# Patient Record
Sex: MALE | Race: BLACK OR AFRICAN AMERICAN | HISPANIC OR LATINO | Employment: FULL TIME | URBAN - NONMETROPOLITAN AREA
[De-identification: names, ages, dates, MRNs, and addresses within clinical notes are randomized per-mention and may not be internally consistent; named-entity substitution may affect disease eponyms.]

---

## 2022-10-29 ENCOUNTER — OFFICE VISIT (OUTPATIENT)
Dept: URGENT CARE | Facility: CLINIC | Age: 22
End: 2022-10-29
Payer: COMMERCIAL

## 2022-10-29 VITALS
HEART RATE: 69 BPM | DIASTOLIC BLOOD PRESSURE: 102 MMHG | SYSTOLIC BLOOD PRESSURE: 139 MMHG | OXYGEN SATURATION: 97 % | TEMPERATURE: 97.7 F | RESPIRATION RATE: 18 BRPM

## 2022-10-29 DIAGNOSIS — L03.113 CELLULITIS OF RIGHT UPPER EXTREMITY: Primary | ICD-10-CM

## 2022-10-29 PROCEDURE — 99203 OFFICE O/P NEW LOW 30 MIN: CPT

## 2022-10-29 NOTE — PROGRESS NOTES
[facility]        NAME: Jaylin Allen is a 23 y o  male  : 2000    MRN: [id number]  DATE: 2022  TIME: 12:56 PM    Assessment and Plan   Cellulitis of right upper extremity [L03 113]  1  Cellulitis of right upper extremity  Transfer to other facility   Given the extensive streaking up the right forearm recommend ED evaluation and possible IV antibiotics  Patient will go to [facility] via private vehicle today for evaluation  Patient Instructions       Follow up with PCP in 3-5 days  Proceed to  ER if symptoms worsen  Chief Complaint     Chief Complaint   Patient presents with   • Hand Pain     Right  index finger with what appears top be two separate closed lesions onset 4 days ago onset with red streak extending proximal to mid-forearm  c/o pain to forearm and right upper arm         History of Present Illness       Patient is a 23year old male who presents to the office today for a red streak going up his right arm since last night  Has had pain for about 3 days  States infection on finger was about 3 days ago as well  Denies knowing how the wounds got onto his finger  Denies fever or chills  Review of Systems   Review of Systems   Constitutional: Negative for chills and fever  Skin: Positive for wound  All other systems reviewed and are negative  Current Medications     No current outpatient medications on file  Current Allergies     Allergies as of 10/29/2022   • (No Known Allergies)            The following portions of the patient's history were reviewed and updated as appropriate: allergies, current medications, past family history, past medical history, past social history, past surgical history and problem list      History reviewed  No pertinent past medical history  History reviewed  No pertinent surgical history  History reviewed  No pertinent family history  Medications have been verified          Objective   BP (!) 139/102   Pulse 69   Temp 97 7 °F (36 5 °C)   Resp 18   SpO2 97%        Physical Exam     Physical Exam  Vitals and nursing note reviewed  Constitutional:       General: He is not in acute distress  Appearance: Normal appearance  He is normal weight  He is not ill-appearing or toxic-appearing  Cardiovascular:      Pulses: Normal pulses  Pulmonary:      Effort: Pulmonary effort is normal       Breath sounds: Normal breath sounds  Musculoskeletal:         General: Swelling and tenderness present  Right hand: Swelling and tenderness present  No deformity, lacerations or bony tenderness  Normal range of motion  Normal strength  Normal sensation  There is no disruption of two-point discrimination  Normal capillary refill  Normal pulse  Hands:       Comments: Swelling erythema and warmth noted at the 2nd digit  The erythema does extend all the way up the right forearm and right upper arm  Skin:     General: Skin is warm  Capillary Refill: Capillary refill takes less than 2 seconds  Findings: Erythema present  Comments: Streaking starting at right 2nd digit extending up right arm with ttp  Neurological:      General: No focal deficit present  Mental Status: He is alert and oriented to person, place, and time